# Patient Record
Sex: FEMALE | Race: WHITE | NOT HISPANIC OR LATINO | Employment: PART TIME | URBAN - METROPOLITAN AREA
[De-identification: names, ages, dates, MRNs, and addresses within clinical notes are randomized per-mention and may not be internally consistent; named-entity substitution may affect disease eponyms.]

---

## 2019-04-17 ENCOUNTER — TELEPHONE (OUTPATIENT)
Dept: MULTI SPECIALTY CLINIC | Facility: CLINIC | Age: 29
End: 2019-04-17

## 2019-04-23 ENCOUNTER — TRANSCRIBE ORDERS (OUTPATIENT)
Dept: INTERNAL MEDICINE CLINIC | Facility: CLINIC | Age: 29
End: 2019-04-23

## 2019-04-23 DIAGNOSIS — L98.9 CHANGING SKIN LESION: Primary | ICD-10-CM

## 2019-04-24 ENCOUNTER — TELEPHONE (OUTPATIENT)
Dept: INTERNAL MEDICINE CLINIC | Facility: CLINIC | Age: 29
End: 2019-04-24

## 2020-02-16 ENCOUNTER — APPOINTMENT (EMERGENCY)
Dept: RADIOLOGY | Facility: HOSPITAL | Age: 30
End: 2020-02-16
Payer: COMMERCIAL

## 2020-02-16 ENCOUNTER — HOSPITAL ENCOUNTER (EMERGENCY)
Facility: HOSPITAL | Age: 30
Discharge: HOME | End: 2020-02-16
Attending: EMERGENCY MEDICINE
Payer: COMMERCIAL

## 2020-02-16 VITALS
HEART RATE: 73 BPM | WEIGHT: 165 LBS | OXYGEN SATURATION: 98 % | SYSTOLIC BLOOD PRESSURE: 130 MMHG | TEMPERATURE: 98 F | RESPIRATION RATE: 18 BRPM | DIASTOLIC BLOOD PRESSURE: 61 MMHG

## 2020-02-16 DIAGNOSIS — M54.50 LOW BACK PAIN: ICD-10-CM

## 2020-02-16 DIAGNOSIS — N20.0 KIDNEY STONES: Primary | ICD-10-CM

## 2020-02-16 LAB
BACTERIA UR QL AUTO: ABNORMAL /HPF
BILIRUB UR QL STRIP: NEGATIVE
CLARITY UR: CLEAR
COLOR UR: YELLOW
COLOR, POC: NORMAL
EXT PREG TEST URINE: NEGATIVE
EXT. CONTROL ED NAV: NORMAL
GLUCOSE UR STRIP-MCNC: NEGATIVE MG/DL
HGB UR QL STRIP.AUTO: ABNORMAL
HYALINE CASTS #/AREA URNS LPF: ABNORMAL /LPF
KETONES UR STRIP-MCNC: NEGATIVE MG/DL
LEUKOCYTE ESTERASE UR QL STRIP: NEGATIVE
NITRITE UR QL STRIP: NEGATIVE
NON-SQ EPI CELLS URNS QL MICRO: ABNORMAL /HPF
PH UR STRIP.AUTO: 7 [PH] (ref 4.5–8)
PROT UR STRIP-MCNC: NEGATIVE MG/DL
RBC #/AREA URNS AUTO: ABNORMAL /HPF
SP GR UR STRIP.AUTO: 1.01 (ref 1–1.03)
UROBILINOGEN UR QL STRIP.AUTO: 0.2 E.U./DL
WBC #/AREA URNS AUTO: ABNORMAL /HPF

## 2020-02-16 PROCEDURE — 76830 TRANSVAGINAL US NON-OB: CPT

## 2020-02-16 PROCEDURE — 96372 THER/PROPH/DIAG INJ SC/IM: CPT

## 2020-02-16 PROCEDURE — 81001 URINALYSIS AUTO W/SCOPE: CPT

## 2020-02-16 PROCEDURE — 81025 URINE PREGNANCY TEST: CPT | Performed by: EMERGENCY MEDICINE

## 2020-02-16 PROCEDURE — 74176 CT ABD & PELVIS W/O CONTRAST: CPT

## 2020-02-16 PROCEDURE — 99284 EMERGENCY DEPT VISIT MOD MDM: CPT

## 2020-02-16 PROCEDURE — 99284 EMERGENCY DEPT VISIT MOD MDM: CPT | Performed by: EMERGENCY MEDICINE

## 2020-02-16 PROCEDURE — 76856 US EXAM PELVIC COMPLETE: CPT

## 2020-02-16 RX ORDER — KETOROLAC TROMETHAMINE 30 MG/ML
15 INJECTION, SOLUTION INTRAMUSCULAR; INTRAVENOUS ONCE
Status: DISCONTINUED | OUTPATIENT
Start: 2020-02-16 | End: 2020-02-16

## 2020-02-16 RX ORDER — KETOROLAC TROMETHAMINE 30 MG/ML
15 INJECTION, SOLUTION INTRAMUSCULAR; INTRAVENOUS ONCE
Status: COMPLETED | OUTPATIENT
Start: 2020-02-16 | End: 2020-02-16

## 2020-02-16 RX ORDER — ACETAMINOPHEN 325 MG/1
975 TABLET ORAL ONCE
Status: COMPLETED | OUTPATIENT
Start: 2020-02-16 | End: 2020-02-16

## 2020-02-16 RX ADMIN — KETOROLAC TROMETHAMINE 15 MG: 30 INJECTION, SOLUTION INTRAMUSCULAR at 17:48

## 2020-02-16 RX ADMIN — ACETAMINOPHEN 975 MG: 325 TABLET ORAL at 18:37

## 2020-02-16 NOTE — ED ATTENDING ATTESTATION
2/16/2020  Uday SANCHEZ DO, saw and evaluated the patient  I have discussed the patient with the resident/non-physician practitioner and agree with the resident's/non-physician practitioner's findings, Plan of Care, and MDM as documented in the resident's/non-physician practitioner's note, except where noted  All available labs and Radiology studies were reviewed  I was present for key portions of any procedure(s) performed by the resident/non-physician practitioner and I was immediately available to provide assistance  At this point I agree with the current assessment done in the Emergency Department  I have conducted an independent evaluation of this patient a history and physical is as follows:    34 y o  female presents for low back pain  On right  Onset last night was picking up somehwat heavy things at work  Described as aching, does not radiate, maybe into buttocks  Flank pain  Denies urinary retention or incontinence, saddle anesthesia, leg weakness, paresthesias  No other modifying factors  Moderate severity  No other associated symptoms  Normal physical exam other than pertinent findings below  Suprapubic tenderness  no cva  A/P: low back pain, flank pain  Check ua                       ED Course         Critical Care Time  Procedures

## 2020-02-18 ENCOUNTER — HOSPITAL ENCOUNTER (EMERGENCY)
Facility: HOSPITAL | Age: 30
Discharge: HOME | End: 2020-02-18
Attending: EMERGENCY MEDICINE | Admitting: EMERGENCY MEDICINE
Payer: COMMERCIAL

## 2020-02-18 VITALS
DIASTOLIC BLOOD PRESSURE: 74 MMHG | TEMPERATURE: 98 F | RESPIRATION RATE: 16 BRPM | HEART RATE: 76 BPM | WEIGHT: 172 LBS | OXYGEN SATURATION: 99 % | SYSTOLIC BLOOD PRESSURE: 135 MMHG

## 2020-02-18 DIAGNOSIS — R10.9 INTERMITTENT ABDOMINAL PAIN: Primary | ICD-10-CM

## 2020-02-18 LAB
BACTERIA UR QL AUTO: ABNORMAL /HPF
BILIRUB UR QL STRIP: NEGATIVE
CLARITY UR: CLEAR
COLOR UR: YELLOW
EXT PREG TEST URINE: NEGATIVE
EXT. CONTROL ED NAV: NORMAL
GLUCOSE UR STRIP-MCNC: NEGATIVE MG/DL
HGB UR QL STRIP.AUTO: ABNORMAL
KETONES UR STRIP-MCNC: NEGATIVE MG/DL
LEUKOCYTE ESTERASE UR QL STRIP: NEGATIVE
NITRITE UR QL STRIP: NEGATIVE
NON-SQ EPI CELLS URNS QL MICRO: ABNORMAL /HPF
PH UR STRIP.AUTO: 7.5 [PH] (ref 4.5–8)
PROT UR STRIP-MCNC: NEGATIVE MG/DL
RBC #/AREA URNS AUTO: ABNORMAL /HPF
SP GR UR STRIP.AUTO: 1.01 (ref 1–1.03)
UROBILINOGEN UR QL STRIP.AUTO: 0.2 E.U./DL
WBC #/AREA URNS AUTO: ABNORMAL /HPF

## 2020-02-18 PROCEDURE — 99284 EMERGENCY DEPT VISIT MOD MDM: CPT

## 2020-02-18 PROCEDURE — 81025 URINE PREGNANCY TEST: CPT | Performed by: EMERGENCY MEDICINE

## 2020-02-18 PROCEDURE — 81001 URINALYSIS AUTO W/SCOPE: CPT

## 2020-02-18 PROCEDURE — 99284 EMERGENCY DEPT VISIT MOD MDM: CPT | Performed by: EMERGENCY MEDICINE

## 2020-02-18 RX ORDER — DICYCLOMINE HCL 20 MG
20 TABLET ORAL EVERY 6 HOURS PRN
Qty: 20 TABLET | Refills: 0 | Status: SHIPPED | OUTPATIENT
Start: 2020-02-18

## 2020-02-18 NOTE — ED PROVIDER NOTES
History  Chief Complaint   Patient presents with    Abdominal Pain     abd and back pain onset sunday  Went to [facility] on sunday for same complaint       History provided by:  Patient   used: No    Abdominal Pain   Associated symptoms: no diarrhea, no dysuria, no fatigue, no fever, no nausea, no shortness of breath and no vomiting    34year-old otherwise healthy female presented for re-evaluation of abdominal pain developing 2 days ago  The pain has gotten worse today but since improved prior to ED visit  Noted that initially it was in her right flank, radiating around to the right groin  Was seen at [facility] ED, had stone study CT which was notable for multiple bilateral renal stones but no ureteral stones  No other acute abnormalities  Also had pelvic ultrasound negative for ovarian pathology and notable for IUD appropriately in place in the uterus  Patient stated that this afternoon pain returned but more in the periumbilical area  It was sharp, severe at the time but has since improved  Vitals are normal   She was actively eating when I walked in the room  Has some mild suprapubic tenderness  UA done prior to my eval unremarkable  Pregnancy negative  Discussed her normal imaging results from 2 days ago  No differential diagnosis includes viral illness, IBS, bowel spasm  No appendicitis, ovarian torsion, sign of infection  Given script for Bentyl as needed for abdominal pain  PCP follow-up  Return if worse  None       History reviewed  No pertinent past medical history  History reviewed  No pertinent surgical history  History reviewed  No pertinent family history  I have reviewed and agree with the history as documented  Social History     Tobacco Use    Smoking status: Current Every Day Smoker     Packs/day: 0 50     Types: Cigarettes    Smokeless tobacco: Never Used   Substance Use Topics    Alcohol use:  Yes    Drug use: Not Currently       Review of Systems   Constitutional: Negative for activity change, appetite change, fatigue and fever  Respiratory: Negative for chest tightness and shortness of breath  Gastrointestinal: Positive for abdominal pain  Negative for blood in stool, diarrhea, nausea and vomiting  Genitourinary: Negative for difficulty urinating and dysuria  Musculoskeletal: Negative for back pain and neck pain  Skin: Negative for color change and rash  Neurological: Negative for dizziness, weakness and headaches  All other systems reviewed and are negative  Physical Exam  Physical Exam   Constitutional: She is oriented to person, place, and time  She appears well-developed and well-nourished  She does not appear ill  HENT:   Head: Normocephalic  Mouth/Throat: Oropharynx is clear and moist    Cardiovascular: Normal rate and regular rhythm  Pulmonary/Chest: Effort normal  No respiratory distress  Abdominal: Soft  Normal appearance  Mild suprapubic tenderness without rebound or guarding  Neurological: She is alert and oriented to person, place, and time  Skin: Skin is warm and dry  Psychiatric: She has a normal mood and affect  Her behavior is normal    Nursing note and vitals reviewed  Vital Signs  ED Triage Vitals [02/18/20 1723]   Temperature Pulse Respirations Blood Pressure SpO2   98 °F (36 7 °C) 76 16 135/74 99 %      Temp Source Heart Rate Source Patient Position - Orthostatic VS BP Location FiO2 (%)   Oral Monitor Sitting Left arm --      Pain Score       6           Vitals:    02/18/20 1723   BP: 135/74   Pulse: 76   Patient Position - Orthostatic VS: Sitting         Visual Acuity      ED Medications  Medications - No data to display    Diagnostic Studies  Results Reviewed     Procedure Component Value Units Date/Time    Urine Microscopic [[id number]] Collected:  02/18/20 1758    Lab Status:   In process Specimen:  Urine, Clean Catch Updated:  02/18/20 1755    POCT pregnancy, urine [[id number]]  (Normal) Resulted:  02/18/20 1754    Lab Status:  Final result Updated:  02/18/20 1754     EXT PREG TEST UR (Ref: Negative) negative     Control valid    Urine Macroscopic, POC [[id number]]  (Abnormal) Collected:  02/18/20 1758    Lab Status:  Final result Specimen:  Urine Updated:  02/18/20 1753     Color, UA Yellow     Clarity, UA Clear     pH, UA 7 5     Leukocytes, UA Negative     Nitrite, UA Negative     Protein, UA Negative mg/dl      Glucose, UA Negative mg/dl      Ketones, UA Negative mg/dl      Urobilinogen, UA 0 2 E U /dl      Bilirubin, UA Negative     Blood, UA Trace     Specific Ashley, UA 1 015    Narrative:       CLINITEK RESULT                 No orders to display              Procedures  Procedures         ED Course                               MDM  Number of Diagnoses or Management Options  Intermittent abdominal pain: established and worsening  Diagnosis management comments: 31-year-old female with abdominal/flank pain beginning 2 days ago, had workup at [place] [facility] which was unremarkable  Pain recurred today but resolved prior to my evaluation  Other than some mild suprapubic tenderness her exam was unremarkable  UA and pregnancy negative  Reviewed imaging studies from prior visit and discussed with patient  Given Bentyl for symptomatic relief as needed  PCP follow-up  Amount and/or Complexity of Data Reviewed  Clinical lab tests: reviewed  Tests in the radiology section of CPT®: reviewed    Patient Progress  Patient progress: improved        Disposition  Final diagnoses:   Intermittent abdominal pain     Time reflects when diagnosis was documented in both MDM as applicable and the Disposition within this note     Time User Action Codes Description Comment    2/18/2020  6:12 PM Kevin Giron Add [R10 9] Intermittent abdominal pain       ED Disposition     ED Disposition Condition Date/Time Comment    Discharge Stable Tu Feb 18, 2020  6:12 PM Ginette Mendez discharge to home/self care  Follow-up Information     Follow up With Specialties Details Why [address] Mary Anne Cortes MD Internal Medicine   [address] [facility]  [phone]            Patient's Medications   Discharge Prescriptions    DICYCLOMINE (BENTYL) 20 MG TABLET    Take 1 tablet (20 mg total) by mouth every 6 (six) hours as needed (abdominal pain)       Start Date: 2/18/2020 End Date: --       Order Dose: 20 mg       Quantity: 20 tablet    Refills: 0     No discharge procedures on file      PDMP Review     None          ED Provider  Electronically Signed by           Yong Olvera MD  02/18/20 0009

## 2020-02-19 NOTE — ED PROVIDER NOTES
History  Chief Complaint   Patient presents with    Back Pain     Patient states "I was working last night and I picked up stuff, I mostly have pain in my back on the R side"  Patient is a 19-year-old female that is otherwise healthy that presents for evaluation of right flank/right abdominal pain  Patient was in her normal state health last night when she was lifting boxes at work and developed right flank pain  She says the pain is worsening since this time  She says that she has right flank pain radiating into her right lower quadrant  The pain is sharp/stabbing in nature and it is difficult for her to find a comfortable position  She denies any associated nausea or vomiting  She is handling p o  She has taken Tylenol Motrin for the pain  She denies any hematuria or dysuria  She denies any diarrhea, melena hematochezia  She has an IUD and has not had a menstrual period for months  She currently denies any vaginal bleeding or vaginal discharge  No history of abdominal surgeries in the past   No prior history of kidney stones, pyelonephritis  She does known history of ovarian cyst           None       History reviewed  No pertinent past medical history  History reviewed  No pertinent surgical history  History reviewed  No pertinent family history  I have reviewed and agree with the history as documented  Social History     Tobacco Use    Smoking status: Current Every Day Smoker     Packs/day: 0 50     Types: Cigarettes    Smokeless tobacco: Never Used   Substance Use Topics    Alcohol use: Yes    Drug use: Not Currently        Review of Systems   Constitutional: Negative for chills, diaphoresis, fatigue and fever  HENT: Negative for facial swelling, sore throat and trouble swallowing  Respiratory: Negative for cough, chest tightness, shortness of breath and wheezing  Cardiovascular: Negative for chest pain  Gastrointestinal: Positive for abdominal pain   Negative for abdominal distention, diarrhea, nausea and vomiting  Genitourinary: Negative for dysuria  Musculoskeletal: Positive for back pain  Negative for neck pain and neck stiffness  Skin: Negative for color change, pallor, rash and wound  Neurological: Negative for weakness, light-headedness and numbness  Psychiatric/Behavioral: Negative for agitation  All other systems reviewed and are negative  Physical Exam  ED Triage Vitals [02/16/20 1716]   Temperature Pulse Respirations Blood Pressure SpO2   98 °F (36 7 °C) 84 18 120/92 98 %      Temp Source Heart Rate Source Patient Position - Orthostatic VS BP Location FiO2 (%)   Oral Monitor Sitting Right arm --      Pain Score       9             Orthostatic Vital Signs  Vitals:    02/16/20 1716 02/16/20 2052   BP: 120/92 130/61   Pulse: 84 73   Patient Position - Orthostatic VS: Sitting Lying       Physical Exam   Constitutional: She is oriented to person, place, and time  She appears well-developed and well-nourished  No distress  HENT:   Head: Normocephalic  Eyes: Pupils are equal, round, and reactive to light  Neck: Normal range of motion  Neck supple  Cardiovascular: Normal rate, regular rhythm, normal heart sounds and intact distal pulses  Pulmonary/Chest: Effort normal and breath sounds normal    Abdominal: Soft  Bowel sounds are normal  She exhibits no distension  There is tenderness  There is no guarding  Patient with moderate right lower quadrant abdominal tenderness  No rebound tenderness or guarding  No masses palpated  Non peritoneal abdomen   Musculoskeletal: Normal range of motion  She exhibits no edema, tenderness or deformity  No CVA tenderness bilaterally   Neurological: She is alert and oriented to person, place, and time  No cranial nerve deficit or sensory deficit  Skin: Skin is warm and dry  Capillary refill takes less than 2 seconds  Psychiatric: She has a normal mood and affect   Her behavior is normal  Judgment and thought content normal    Vitals reviewed  ED Medications  Medications   ketorolac (TORADOL) injection 15 mg (15 mg Intramuscular Given 2/16/20 1748)   acetaminophen (TYLENOL) tablet 975 mg (975 mg Oral Given 2/16/20 1837)       Diagnostic Studies  Results Reviewed     Procedure Component Value Units Date/Time    POCT pregnancy, urine [[id number]]  (Normal) Resulted:  02/16/20 1759    Lab Status:  Edited Result - FINAL Updated:  02/16/20 2009     EXT PREG TEST UR (Ref: Negative) negative     Control valid    Urine Microscopic [[id number]]  (Abnormal) Collected:  02/16/20 1755    Lab Status:  Final result Specimen:  Urine Updated:  02/16/20 1806     RBC, UA 4-10 /hpf      WBC, UA None Seen /hpf      Epithelial Cells None Seen /hpf      Bacteria, UA None Seen /hpf      Hyaline Casts, UA None Seen /lpf     POCT urinalysis dipstick [[id number]]  (Normal) Resulted:  02/16/20 1759    Lab Status:  Final result Updated:  02/16/20 1759     Color, UA   Urine Macroscopic, POC [[id number]]  (Abnormal) Collected:  02/16/20 1755    Lab Status:  Final result Specimen:  Urine Updated:  02/16/20 1752     Color, UA Yellow     Clarity, UA Clear     pH, UA 7 0     Leukocytes, UA Negative     Nitrite, UA Negative     Protein, UA Negative mg/dl      Glucose, UA Negative mg/dl      Ketones, UA Negative mg/dl      Urobilinogen, UA 0 2 E U /dl      Bilirubin, UA Negative     Blood, UA Moderate     Specific Kennedy, UA 1 015    Narrative:       CLINITEK RESULT                 US pelvis complete w transvaginal   Final Result by Zara Levi MD (02/16 2103)       1  IUD in place  2   Otherwise normal pelvic sonogram                          Workstation performed: [id number]         CT renal stone study abdomen pelvis wo contrast   Final Result by Zara Levi MD (02/16 1903)      1  Several tiny bilateral nonobstructing renal calculi, measuring 1 to 2 mm     2   No ureteral calculus, hydronephrosis or other significant abnormality in the abdomen or pelvis  Workstation performed: [id number]               Procedures  Procedures      ED Course                               MDM  Number of Diagnoses or Management Options  Kidney stones:   Low back pain:   Diagnosis management comments: Patient is a right lower patient is a 66-year-old female that presents with right-sided flank pain right lower quadrant abdominal pain with tenderness  Initial urinalysis revealed hematuria  She was not pregnant  Concern for possible kidney stone  CT renal stone study was negative though patient was noted to have kidney stones  Secondary to history of ovarian cysts, patient had pelvic ultrasound to rule out ovarian torsion  Pelvic ultrasound was negative  Patient was treated for pain with Tylenol and Toradol  Patient reported improving pain and was discharged  I believe that her presentation likely represents a passed ureteral stone versus ruptured ovarian cyst   She was advised to continue use Tylenol Motrin moving forward and to return to care if she has any new or worsening symptoms  Disposition  Final diagnoses:   Kidney stones   Low back pain     Time reflects when diagnosis was documented in both MDM as applicable and the Disposition within this note     Time User Action Codes Description Comment    2/16/2020  9:08 PM Stalin Carrillo Add [N20 0] Kidney stones     2/16/2020  9:08 PM Stalin Carrillo Add [M54 5] Low back pain       ED Disposition     ED Disposition Condition Date/Time Comment    Discharge Stable Sun Feb 16, 2020  9:08 PM Yoni Valencia discharge to home/self care  Follow-up Information     Follow up With Specialties Details Why Ade Hooker MD Internal Medicine Schedule an appointment as soon as possible for a visit  As needed [address] [facility] [id number]  [phone]            There are no discharge medications for this patient      No discharge procedures on file  ED Provider  Attending physically available and evaluated Omar Saldana I managed the patient along with the ED Attending      Electronically Signed by         Belia Johnson MD  02/19/20 7501